# Patient Record
Sex: FEMALE | Race: WHITE
[De-identification: names, ages, dates, MRNs, and addresses within clinical notes are randomized per-mention and may not be internally consistent; named-entity substitution may affect disease eponyms.]

---

## 2019-08-27 ENCOUNTER — HOSPITAL ENCOUNTER (EMERGENCY)
Dept: HOSPITAL 4 - SED | Age: 24
Discharge: HOME | End: 2019-08-27
Payer: MEDICAID

## 2019-08-27 VITALS — HEIGHT: 63 IN | WEIGHT: 150 LBS | BODY MASS INDEX: 26.58 KG/M2

## 2019-08-27 VITALS — SYSTOLIC BLOOD PRESSURE: 125 MMHG

## 2019-08-27 VITALS — SYSTOLIC BLOOD PRESSURE: 123 MMHG

## 2019-08-27 DIAGNOSIS — K52.9: Primary | ICD-10-CM

## 2019-08-27 LAB
ALBUMIN SERPL BCP-MCNC: 3.5 G/DL (ref 3.4–4.8)
ALT SERPL W P-5'-P-CCNC: 17 U/L (ref 12–78)
ANION GAP SERPL CALCULATED.3IONS-SCNC: 12 MMOL/L (ref 5–15)
AST SERPL W P-5'-P-CCNC: 13 U/L (ref 10–37)
BASOPHILS # BLD AUTO: 0 K/UL (ref 0–0.2)
BASOPHILS NFR BLD AUTO: 0.2 % (ref 0–2)
BILIRUB SERPL-MCNC: 1.4 MG/DL (ref 0–1)
BUN SERPL-MCNC: 10 MG/DL (ref 8–21)
CALCIUM SERPL-MCNC: 8.2 MG/DL (ref 8.4–11)
CHLORIDE SERPL-SCNC: 103 MMOL/L (ref 98–107)
CREAT SERPL-MCNC: 0.61 MG/DL (ref 0.55–1.3)
EOSINOPHIL # BLD AUTO: 0.1 K/UL (ref 0–0.4)
EOSINOPHIL NFR BLD AUTO: 1.1 % (ref 0–4)
ERYTHROCYTE [DISTWIDTH] IN BLOOD BY AUTOMATED COUNT: 13.5 % (ref 9–15)
GFR SERPL CREATININE-BSD FRML MDRD: 154 ML/MIN (ref 90–?)
GLUCOSE SERPL-MCNC: 81 MG/DL (ref 70–99)
HCT VFR BLD AUTO: 42.9 % (ref 36–48)
HGB BLD-MCNC: 14.8 G/DL (ref 12–16)
LIPASE SERPL-CCNC: 44 U/L (ref 73–393)
LYMPHOCYTES # BLD AUTO: 0.8 K/UL (ref 1–5.5)
LYMPHOCYTES NFR BLD AUTO: 11.5 % (ref 20.5–51.5)
MCH RBC QN AUTO: 33 PG (ref 27–31)
MCHC RBC AUTO-ENTMCNC: 35 % (ref 32–36)
MCV RBC AUTO: 95 FL (ref 79–98)
MONOCYTES # BLD MANUAL: 0.7 K/UL (ref 0–1)
MONOCYTES # BLD MANUAL: 10.6 % (ref 1.7–9.3)
NEUTROPHILS # BLD AUTO: 5.4 K/UL (ref 1.8–7.7)
NEUTROPHILS NFR BLD AUTO: 76.6 % (ref 40–70)
PLATELET # BLD AUTO: 278 K/UL (ref 130–430)
POTASSIUM SERPL-SCNC: 3.4 MMOL/L (ref 3.5–5.1)
RBC # BLD AUTO: 4.52 MIL/UL (ref 4.2–6.2)
SODIUM SERPLBLD-SCNC: 140 MMOL/L (ref 136–145)
WBC # BLD AUTO: 7 K/UL (ref 4.8–10.8)

## 2019-08-27 PROCEDURE — 80053 COMPREHEN METABOLIC PANEL: CPT

## 2019-08-27 PROCEDURE — 85025 COMPLETE CBC W/AUTO DIFF WBC: CPT

## 2019-08-27 PROCEDURE — 81002 URINALYSIS NONAUTO W/O SCOPE: CPT

## 2019-08-27 PROCEDURE — 96374 THER/PROPH/DIAG INJ IV PUSH: CPT

## 2019-08-27 PROCEDURE — 83690 ASSAY OF LIPASE: CPT

## 2019-08-27 PROCEDURE — 81025 URINE PREGNANCY TEST: CPT

## 2019-08-27 PROCEDURE — 99283 EMERGENCY DEPT VISIT LOW MDM: CPT

## 2019-08-27 PROCEDURE — 36415 COLL VENOUS BLD VENIPUNCTURE: CPT

## 2019-08-27 PROCEDURE — 96361 HYDRATE IV INFUSION ADD-ON: CPT

## 2019-08-27 NOTE — NUR
PATIENT CAME IN COMPLAINING OF DIRRHEA AND VOMITING FOR PAST 2 DAYS. PATIENT 
STATES SHE HAS NOT BEEN ABLE TO EAT OR DRINK BECAUSE IT JUST COMES BACK OUT. 
PATIENT COMPLAINING OF BODY ACHES 8/10. PATIENT DENIES SOB, FEVER, AND CHILLS. 
PATIENT ALERT AND ORIENTED X4.

## 2019-08-27 NOTE — NUR
Patient given written and verbal discharge instructions and verbalizes 
understanding.  ER MD discussed with patient the results and treatment 
provided. Patient in stable condition. ID arm band removed. IV catheter removed 
intact and dressing applied, no active bleeding.

Rx of LOMOTIL given. Patient educated on pain management and to follow up with 
PMD. Pain Scale 2/10 TOLERABLE. Opportunity for questions provided and 
answered. Medication side effect fact sheet provided.

## 2019-12-03 ENCOUNTER — HOSPITAL ENCOUNTER (EMERGENCY)
Dept: HOSPITAL 4 - SED | Age: 24
Discharge: HOME | End: 2019-12-03
Payer: MEDICAID

## 2019-12-03 VITALS — SYSTOLIC BLOOD PRESSURE: 125 MMHG

## 2019-12-03 VITALS — BODY MASS INDEX: 28.35 KG/M2 | WEIGHT: 160 LBS | HEIGHT: 63 IN

## 2019-12-03 DIAGNOSIS — K52.9: Primary | ICD-10-CM

## 2019-12-03 NOTE — NUR
Patient c/o of nausea that started today and diarrhea that started x 2 days ago 
as well as diffuse abdominal discomfort. Patient stated it could have been from 
something she ate. Patient in no signs of distress @ this time.

## 2019-12-03 NOTE — NUR
Patient given written and verbal discharge instructions and verbalizes 
understanding.  ER MD discussed with patient the results and treatment 
provided. Patient in stable condition. ID arm band removed. 

Rx of lomotil and bactri, given. Patient educated on pain management and to 
follow up with PMD. Pain Scale 0/10. Medication side effect fact sheet 
provided.

## 2020-02-21 ENCOUNTER — HOSPITAL ENCOUNTER (EMERGENCY)
Dept: HOSPITAL 4 - SED | Age: 25
Discharge: HOME | End: 2020-02-21
Payer: MEDICAID

## 2020-02-21 VITALS — SYSTOLIC BLOOD PRESSURE: 112 MMHG

## 2020-02-21 VITALS — WEIGHT: 160 LBS | BODY MASS INDEX: 28.35 KG/M2 | HEIGHT: 63 IN

## 2020-02-21 DIAGNOSIS — R05: Primary | ICD-10-CM

## 2020-02-21 DIAGNOSIS — R51: ICD-10-CM

## 2020-02-21 DIAGNOSIS — M79.18: ICD-10-CM

## 2020-02-21 PROCEDURE — 99283 EMERGENCY DEPT VISIT LOW MDM: CPT

## 2020-02-21 PROCEDURE — 96374 THER/PROPH/DIAG INJ IV PUSH: CPT

## 2020-02-21 NOTE — NUR
Patient is awake, alert, and oriented x4.  Patient states she woke up around 
0300 today feeling flu like symptoms.  She states she has to take her LSAT 
tomorrow and would like her symptoms taken care of.

## 2020-02-21 NOTE — NUR
Patient given written and verbal discharge instructions and verbalizes 
understanding.  ER MD discussed with patient the results and treatment 
provided. Patient in stable condition. ID arm band removed.

Rx of tamiflu given. Patient educated on pain management and to follow up with 
PMD. Pain Scale 0/10.

Opportunity for questions provided and answered. Medication side effect fact 
sheet provided.

## 2020-11-19 ENCOUNTER — HOSPITAL ENCOUNTER (EMERGENCY)
Dept: HOSPITAL 4 - SED | Age: 25
Discharge: HOME | End: 2020-11-19
Payer: MEDICAID

## 2020-11-19 VITALS — BODY MASS INDEX: 30.12 KG/M2 | WEIGHT: 170 LBS | HEIGHT: 63 IN

## 2020-11-19 VITALS — SYSTOLIC BLOOD PRESSURE: 110 MMHG

## 2020-11-19 DIAGNOSIS — R10.32: Primary | ICD-10-CM

## 2020-11-19 PROCEDURE — 99283 EMERGENCY DEPT VISIT LOW MDM: CPT

## 2020-11-19 PROCEDURE — 96372 THER/PROPH/DIAG INJ SC/IM: CPT

## 2020-11-19 NOTE — NUR
Patient presented to ER C/O abdominal pain. Patient Ambulatory to ER A&Ox4, 
afebrile, skin pink and wam, pain 7/10, denies N/V/D. Patient states she has 
left lower pelvic pain today.

## 2020-11-19 NOTE — NUR
Patient given written and verbal discharge instructions and verbalizes 
understanding.  ER MD discussed with patient the results and treatment 
provided. Patient in stable condition. ID arm band removed. 

Rx of IBU/NORCO given. Patient educated on pain management and to follow up 
with PMD. Pain Scale 2/10

Opportunity for questions provided and answered. Medication side effect fact 
sheet provided.

## 2022-04-26 ENCOUNTER — HOSPITAL ENCOUNTER (EMERGENCY)
Dept: HOSPITAL 4 - SED | Age: 27
Discharge: HOME | End: 2022-04-26
Payer: MEDICAID

## 2022-04-26 VITALS — SYSTOLIC BLOOD PRESSURE: 122 MMHG

## 2022-04-26 VITALS — SYSTOLIC BLOOD PRESSURE: 111 MMHG

## 2022-04-26 VITALS — HEIGHT: 63 IN | BODY MASS INDEX: 34.55 KG/M2 | WEIGHT: 195 LBS
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
## 2022-04-26 DIAGNOSIS — S60.221A: Primary | ICD-10-CM
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
## 2022-04-26 NOTE — NUR
Patient given written and verbal discharge instructions and verbalizes 
understanding.  ER MD discussed with patient the results and treatment 
provided. Patient in stable condition. ID arm band removed. 

Rx of Naproxen given. Patient educated on pain management and to follow up with 
PMD. Pain Scale 0/10.

Opportunity for questions provided and answered. Medication side effect fact 
sheet provided.

## 2022-04-26 NOTE — NUR
Pt report received. Pt states "I think I might have a fractured arm." Pt c/o 
right hand and wrist swelling and pain since Easter. Pt states that while 
jogging, she hit her hand on the corner of a table. Swelling noted to medial 
right hand extending to medial aspect of wrist. No deformities noted. Pt able 
to open right hand and move fingers without difficulty but c/o pain with 
attempts at making a fist. Cap refil < 3 sec to nail beds.

## 2022-04-26 NOTE — NUR
Pt brought by self, A&Ox4, pt presents to ER with R hand pain /swelling after 
hitting the corner of a bed, skin pink and warm, cap refill <3, VSS, no open 
injuries noted.

## 2023-05-21 ENCOUNTER — HOSPITAL ENCOUNTER (EMERGENCY)
Dept: HOSPITAL 4 - SED | Age: 28
Discharge: HOME | End: 2023-05-21
Payer: MEDICAID

## 2023-05-21 VITALS — SYSTOLIC BLOOD PRESSURE: 122 MMHG | BODY MASS INDEX: 29.23 KG/M2 | HEIGHT: 63 IN | WEIGHT: 165 LBS
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
## 2023-05-21 DIAGNOSIS — S39.012A: Primary | ICD-10-CM
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
## 2023-05-21 DIAGNOSIS — Z79.899: ICD-10-CM

## 2023-05-21 PROCEDURE — 96372 THER/PROPH/DIAG INJ SC/IM: CPT

## 2023-05-21 PROCEDURE — 99283 EMERGENCY DEPT VISIT LOW MDM: CPT

## 2023-05-21 NOTE — NUR
Patient given written and verbal discharge instructions and verbalizes 
understanding.  ER MD discussed with patient the results and treatment 
provided. Patient in stable condition. ID arm band removed.

Rx of SOMA, MOTRIN given. Patient educated on pain management and to follow up 
with PMD. Pain Scale 0/10.

Opportunity for questions provided and answered. Medication side effect fact 
sheet provided.

## 2023-05-21 NOTE — NUR
PT STATES RIGHT LOWER BACK PAIN AFTER USING WEIGHT MACHINE AT GYM. PT STATES 
INCREASED PAIN WITH AMBULATION AND SITTING.